# Patient Record
Sex: MALE | Race: WHITE | NOT HISPANIC OR LATINO | Employment: UNEMPLOYED | URBAN - METROPOLITAN AREA
[De-identification: names, ages, dates, MRNs, and addresses within clinical notes are randomized per-mention and may not be internally consistent; named-entity substitution may affect disease eponyms.]

---

## 2019-10-27 ASSESSMENT — MIFFLIN-ST. JEOR: SCORE: 1892.83

## 2019-10-28 ENCOUNTER — ANESTHESIA - HEALTHEAST (OUTPATIENT)
Dept: SURGERY | Facility: CLINIC | Age: 39
End: 2019-10-28

## 2019-10-28 ENCOUNTER — SURGERY - HEALTHEAST (OUTPATIENT)
Dept: SURGERY | Facility: CLINIC | Age: 39
End: 2019-10-28

## 2019-10-28 ASSESSMENT — MIFFLIN-ST. JEOR: SCORE: 1885.57

## 2019-10-29 ASSESSMENT — MIFFLIN-ST. JEOR: SCORE: 1888.75

## 2019-10-30 ASSESSMENT — MIFFLIN-ST. JEOR: SCORE: 1903

## 2019-10-31 ASSESSMENT — MIFFLIN-ST. JEOR: SCORE: 1899.18

## 2019-11-08 ENCOUNTER — OFFICE VISIT - HEALTHEAST (OUTPATIENT)
Dept: SURGERY | Facility: CLINIC | Age: 39
End: 2019-11-08

## 2019-11-08 DIAGNOSIS — K56.609 SBO (SMALL BOWEL OBSTRUCTION) (H): ICD-10-CM

## 2019-11-18 ENCOUNTER — AMBULATORY - HEALTHEAST (OUTPATIENT)
Dept: SURGERY | Facility: CLINIC | Age: 39
End: 2019-11-18

## 2021-06-02 NOTE — ANESTHESIA POSTPROCEDURE EVALUATION
Patient: Christian Mcnally  EXPLORATION LAPAROSCOPY, LAPAROTOMY, LYSIS OF ADHESIONS, REPAIR OF INTERNAL MESENTERIC HERNIA  Anesthesia type: general    Patient location: PACU  Last vitals:   Vitals Value Taken Time   /77 10/28/2019  2:00 PM   Temp 37  C (98.6  F) 10/28/2019  1:20 PM   Pulse 87 10/28/2019  2:03 PM   Resp 12 10/28/2019  2:03 PM   SpO2 96 % 10/28/2019  2:03 PM   Vitals shown include unvalidated device data.  Post vital signs: stable  Level of consciousness: awake and responds to simple questions  Post-anesthesia pain: pain controlled  Post-anesthesia nausea and vomiting: no  Pulmonary: unassisted, return to baseline  Cardiovascular: stable and blood pressure at baseline  Hydration: adequate  Anesthetic events: no    QCDR Measures:  ASA# 11 - Luci-op Cardiac Arrest: ASA11B - Patient did NOT experience unanticipated cardiac arrest  ASA# 12 - Luci-op Mortality Rate: ASA12B - Patient did NOT die  ASA# 13 - PACU Re-Intubation Rate: ASA13B - Patient did NOT require a new airway mgmt  ASA# 10 - Composite Anes Safety: ASA10A - No serious adverse event    Additional Notes:

## 2021-06-02 NOTE — ANESTHESIA CARE TRANSFER NOTE
Last vitals:   Vitals:    10/28/19 1252   BP: 140/80   Pulse: 89   Resp: 16   Temp: 36.9  C (98.4  F)   SpO2: 95%     Patient's level of consciousness is drowsy  Spontaneous respirations: yes  Maintains airway independently: yes  Dentition unchanged: yes  Oropharynx: oropharynx clear of all foreign objects    QCDR Measures:  ASA# 20 - Surgical Safety Checklist: WHO surgical safety checklist completed prior to induction    PQRS# 430 - Adult PONV Prevention: 4558F - Pt received => 2 anti-emetic agents (different classes) preop & intraop  ASA# 8 - Peds PONV Prevention: NA - Not pediatric patient, not GA or 2 or more risk factors NOT present  PQRS# 424 - Luci-op Temp Management: 4559F - At least one body temp DOCUMENTED => 35.5C or 95.9F within required timeframe  PQRS# 426 - PACU Transfer Protocol: - Transfer of care checklist used  ASA# 14 - Acute Post-op Pain: ASA14B - Patient did NOT experience pain >= 7 out of 10

## 2021-06-02 NOTE — ANESTHESIA PREPROCEDURE EVALUATION
Anesthesia Evaluation      Patient summary reviewed     Airway   Mallampati: I  Neck ROM: full   Pulmonary - negative ROS and normal exam    breath sounds clear to auscultation                         Cardiovascular - negative ROS and normal exam  Rhythm: regular  Rate: normal,         Neuro/Psych - negative ROS     Endo/Other - negative ROS      GI/Hepatic/Renal    (+)   chronic renal disease,           Dental - normal exam                        Anesthesia Plan  Planned anesthetic: general endotracheal    ASA 2   Induction: intravenous   Anesthetic plan and risks discussed with: spouse and patient  Anesthesia plan special considerations: rapid sequence induction,   Post-op plan: routine recovery

## 2021-06-03 VITALS
SYSTOLIC BLOOD PRESSURE: 120 MMHG | TEMPERATURE: 98.3 F | DIASTOLIC BLOOD PRESSURE: 62 MMHG | BODY MASS INDEX: 26.85 KG/M2 | HEART RATE: 80 BPM | WEIGHT: 198 LBS | OXYGEN SATURATION: 99 %

## 2021-06-03 VITALS — HEIGHT: 72 IN | WEIGHT: 210.8 LBS | BODY MASS INDEX: 28.55 KG/M2

## 2021-06-03 NOTE — PROGRESS NOTES
HPI: Pt is here for follow up of a explore lap and reduction and repair of an internal hernia.  He is doing well. His appetite is good, and bowel function regular.  No fevers or chills. Ambulating without problems.       /62 (Patient Site: Right Arm, Patient Position: Sitting, Cuff Size: Adult Small)   Pulse 80   Temp 98.3  F (36.8  C) (Tympanic)   Wt 198 lb (89.8 kg)   SpO2 99%   BMI 26.85 kg/m        EXAM: This is a  39 y.o. MAN in no distress  GENERAL: Appears well  CHEST clear  CVS S1S2 NSR  ABDOMEN: Soft, non-tender.   EXT: warm, moves without difficulty      Assessment/Plan:   S/P repair of internal hernia  2-3 more weeks of no strenuous activity or heavy lifting.  Diet as tolerates  Staples out today  Keep steri-strips on for 7-10 days  Follow up with me as needed  Call for questions or concerns    Stuart Sarmiento MD  [facility] Department of Surgery

## 2021-06-03 NOTE — PROGRESS NOTES
Attending Physicians Statement and [place] paperwork received on 11/18/19 by fax. When completed, forms to be faxed to:        [employer]  F: [phone]  Attn: Randi Rdz  P: [phone]      Denise Uribe LPN  [facility]  General Surgery  P: [phone]  F: [phone]

## 2021-06-16 PROBLEM — F15.93 CAFFEINE WITHDRAWAL (H): Status: ACTIVE | Noted: 2019-10-27

## 2021-06-16 PROBLEM — R11.14 BILIOUS VOMITING WITH NAUSEA: Status: ACTIVE | Noted: 2019-10-27

## 2021-06-16 PROBLEM — N17.9 AKI (ACUTE KIDNEY INJURY) (H): Status: ACTIVE | Noted: 2019-10-27

## 2021-06-16 PROBLEM — F33.40 RECURRENT MAJOR DEPRESSIVE DISORDER, IN REMISSION (H): Status: ACTIVE | Noted: 2019-10-27

## 2021-06-16 PROBLEM — K56.609 SBO (SMALL BOWEL OBSTRUCTION) (H): Status: ACTIVE | Noted: 2019-10-26

## 2021-06-16 PROBLEM — Z01.810 PRE-OPERATIVE CARDIOVASCULAR EXAMINATION: Status: ACTIVE | Noted: 2019-10-28

## 2021-06-16 PROBLEM — Z01.818 PRE-OPERATIVE GENERAL PHYSICAL EXAMINATION: Status: ACTIVE | Noted: 2019-10-28

## 2021-06-27 ENCOUNTER — HEALTH MAINTENANCE LETTER (OUTPATIENT)
Age: 41
End: 2021-06-27

## 2021-10-16 ENCOUNTER — HEALTH MAINTENANCE LETTER (OUTPATIENT)
Age: 41
End: 2021-10-16

## 2022-07-23 ENCOUNTER — HEALTH MAINTENANCE LETTER (OUTPATIENT)
Age: 42
End: 2022-07-23

## 2022-10-01 ENCOUNTER — HEALTH MAINTENANCE LETTER (OUTPATIENT)
Age: 42
End: 2022-10-01

## 2023-08-06 ENCOUNTER — HEALTH MAINTENANCE LETTER (OUTPATIENT)
Age: 43
End: 2023-08-06

## 2024-01-07 PROBLEM — F15.93 CAFFEINE WITHDRAWAL (H): Status: RESOLVED | Noted: 2019-10-27 | Resolved: 2024-01-07

## 2024-03-03 ENCOUNTER — HOSPITAL ENCOUNTER (EMERGENCY)
Facility: CLINIC | Age: 44
Discharge: HOME | End: 2024-03-03
Attending: EMERGENCY MEDICINE | Admitting: EMERGENCY MEDICINE
Payer: COMMERCIAL

## 2024-03-03 ENCOUNTER — APPOINTMENT (OUTPATIENT)
Dept: RADIOLOGY | Facility: CLINIC | Age: 44
End: 2024-03-03
Attending: EMERGENCY MEDICINE
Payer: COMMERCIAL

## 2024-03-03 VITALS
HEART RATE: 97 BPM | WEIGHT: 196 LBS | DIASTOLIC BLOOD PRESSURE: 94 MMHG | SYSTOLIC BLOOD PRESSURE: 142 MMHG | TEMPERATURE: 98.4 F | BODY MASS INDEX: 26.55 KG/M2 | HEIGHT: 72 IN | OXYGEN SATURATION: 99 % | RESPIRATION RATE: 18 BRPM

## 2024-03-03 DIAGNOSIS — R00.2 PALPITATIONS: ICD-10-CM

## 2024-03-03 DIAGNOSIS — R07.9 CHEST PAIN, UNSPECIFIED TYPE: ICD-10-CM

## 2024-03-03 LAB
ANION GAP SERPL CALCULATED.3IONS-SCNC: 11 MMOL/L (ref 7–15)
BASOPHILS # BLD AUTO: 0.1 10E3/UL (ref 0–0.2)
BASOPHILS NFR BLD AUTO: 1 %
BUN SERPL-MCNC: 17.6 MG/DL (ref 6–20)
CALCIUM SERPL-MCNC: 9.6 MG/DL (ref 8.6–10)
CHLORIDE SERPL-SCNC: 103 MMOL/L (ref 98–107)
CREAT SERPL-MCNC: 1.13 MG/DL (ref 0.67–1.17)
DEPRECATED HCO3 PLAS-SCNC: 25 MMOL/L (ref 22–29)
EGFRCR SERPLBLD CKD-EPI 2021: 82 ML/MIN/1.73M2
EOSINOPHIL # BLD AUTO: 0.3 10E3/UL (ref 0–0.7)
EOSINOPHIL NFR BLD AUTO: 4 %
ERYTHROCYTE [DISTWIDTH] IN BLOOD BY AUTOMATED COUNT: 12.8 % (ref 10–15)
GLUCOSE SERPL-MCNC: 129 MG/DL (ref 70–99)
HCT VFR BLD AUTO: 42.7 % (ref 40–53)
HGB BLD-MCNC: 14.8 G/DL (ref 13.3–17.7)
IMM GRANULOCYTES # BLD: 0 10E3/UL
IMM GRANULOCYTES NFR BLD: 0 %
LYMPHOCYTES # BLD AUTO: 2.1 10E3/UL (ref 0–5.3)
LYMPHOCYTES NFR BLD AUTO: 36 %
MCH RBC QN AUTO: 31 PG (ref 26.5–33)
MCHC RBC AUTO-ENTMCNC: 34.7 G/DL (ref 31.5–36.5)
MCV RBC AUTO: 89 FL (ref 78–100)
MONOCYTES # BLD AUTO: 0.5 10E3/UL (ref 0–1.3)
MONOCYTES NFR BLD AUTO: 8 %
NEUTROPHILS # BLD AUTO: 2.9 10E3/UL (ref 1.6–8.3)
NEUTROPHILS NFR BLD AUTO: 51 %
NRBC # BLD AUTO: 0 10E3/UL
NRBC BLD AUTO-RTO: 0 /100
PLATELET # BLD AUTO: 255 10E3/UL (ref 150–450)
POTASSIUM SERPL-SCNC: 3.5 MMOL/L (ref 3.4–5.3)
RBC # BLD AUTO: 4.78 10E6/UL (ref 4.4–5.9)
SODIUM SERPL-SCNC: 139 MMOL/L (ref 135–145)
T4 FREE SERPL-MCNC: 1.06 NG/DL (ref 0.9–1.7)
TROPONIN T SERPL HS-MCNC: <6 NG/L
TSH SERPL DL<=0.005 MIU/L-ACNC: 5.36 UIU/ML (ref 0.3–4.2)
WBC # BLD AUTO: 5.7 10E3/UL (ref 4–11)

## 2024-03-03 PROCEDURE — 71046 X-RAY EXAM CHEST 2 VIEWS: CPT

## 2024-03-03 PROCEDURE — 36415 COLL VENOUS BLD VENIPUNCTURE: CPT | Performed by: EMERGENCY MEDICINE

## 2024-03-03 PROCEDURE — 250N000013 HC RX MED GY IP 250 OP 250 PS 637: Performed by: EMERGENCY MEDICINE

## 2024-03-03 PROCEDURE — 85025 COMPLETE CBC W/AUTO DIFF WBC: CPT | Performed by: EMERGENCY MEDICINE

## 2024-03-03 PROCEDURE — 99285 EMERGENCY DEPT VISIT HI MDM: CPT | Mod: 25

## 2024-03-03 PROCEDURE — 84484 ASSAY OF TROPONIN QUANT: CPT | Performed by: EMERGENCY MEDICINE

## 2024-03-03 PROCEDURE — 84439 ASSAY OF FREE THYROXINE: CPT | Performed by: EMERGENCY MEDICINE

## 2024-03-03 PROCEDURE — 80048 BASIC METABOLIC PNL TOTAL CA: CPT | Performed by: EMERGENCY MEDICINE

## 2024-03-03 PROCEDURE — 84443 ASSAY THYROID STIM HORMONE: CPT | Performed by: EMERGENCY MEDICINE

## 2024-03-03 PROCEDURE — 93005 ELECTROCARDIOGRAM TRACING: CPT | Performed by: EMERGENCY MEDICINE

## 2024-03-03 RX ORDER — ASPIRIN 81 MG/1
324 TABLET, CHEWABLE ORAL ONCE
Status: COMPLETED | OUTPATIENT
Start: 2024-03-03 | End: 2024-03-03

## 2024-03-03 RX ADMIN — ASPIRIN 81 MG CHEWABLE TABLET 324 MG: 81 TABLET CHEWABLE at 21:45

## 2024-03-03 ASSESSMENT — COLUMBIA-SUICIDE SEVERITY RATING SCALE - C-SSRS
6. HAVE YOU EVER DONE ANYTHING, STARTED TO DO ANYTHING, OR PREPARED TO DO ANYTHING TO END YOUR LIFE?: NO
1. IN THE PAST MONTH, HAVE YOU WISHED YOU WERE DEAD OR WISHED YOU COULD GO TO SLEEP AND NOT WAKE UP?: NO
2. HAVE YOU ACTUALLY HAD ANY THOUGHTS OF KILLING YOURSELF IN THE PAST MONTH?: NO

## 2024-03-03 ASSESSMENT — ACTIVITIES OF DAILY LIVING (ADL)
ADLS_ACUITY_SCORE: 35
ADLS_ACUITY_SCORE: 35

## 2024-03-04 LAB
ATRIAL RATE - MUSE: 97 BPM
DIASTOLIC BLOOD PRESSURE - MUSE: NORMAL MMHG
INTERPRETATION ECG - MUSE: NORMAL
P AXIS - MUSE: 68 DEGREES
PR INTERVAL - MUSE: 176 MS
QRS DURATION - MUSE: 98 MS
QT - MUSE: 344 MS
QTC - MUSE: 436 MS
R AXIS - MUSE: 92 DEGREES
SYSTOLIC BLOOD PRESSURE - MUSE: NORMAL MMHG
T AXIS - MUSE: 42 DEGREES
VENTRICULAR RATE- MUSE: 97 BPM

## 2024-03-04 NOTE — ED TRIAGE NOTES
Pt presents to the ED with c/o palpitations that started last night - pt hx of anxiety and took Ativan without relief. Denies CP or SOB during triage.

## 2024-03-04 NOTE — ED PROVIDER NOTES
EMERGENCY DEPARTMENT ENCOUNTER      NAME: Christian Mcnally  AGE: 43 year old male  YOB: 1980  MRN: [id number]  EVALUATION DATE & TIME: 3/3/2024  9:22 PM    PCP: Stephen Holland    ED PROVIDER: Asia Centeno MD      Chief Complaint   Patient presents with    Palpitations         FINAL IMPRESSION:  1. Palpitations    2. Chest pain, unspecified type          ED COURSE & MEDICAL DECISION MAKING:    Pertinent Labs & Imaging studies reviewed. (See chart for details)  43 year old male presents to the Emergency Department for evaluation of point tenderness in the left side of the chest and palpitations.  He developed the left-sided chest discomfort at 3 in the morning last evening, and has been constant.  He also had an episode where his heart rate was in the 120s, he felt clammy in his hands with paresthesias.  Single troponin was obtained and was negative.  Initial TSH was slightly elevated, free T4 was normal, indicating no signs of any thyroid disease.  Electrolytes were within normal limits.  Chest x-ray is unremarkable.  ECG with no acute ischemic changes.  Patient has reproducible left-sided chest wall tenderness.  This is likely musculoskeletal.  Patient reports he does have an underlying history of anxiety, the area of chest pain may have understandably triggered some anxiety.  I would recommend use of acetaminophen and ibuprofen as needed for pain and close follow-up with his PMD for repeat evaluation.  He is comfortable with this plan.    11:05 PM Patient updated with work-up    At the conclusion of the encounter I discussed the results of all of the tests and the disposition. The questions were answered. The patient or family acknowledged understanding and was agreeable with the care plan.         Medical Decision Making  Obtained supplemental history:Supplemental history obtained?: No  Reviewed external records: External records reviewed?: No  Care impacted by chronic illness:N/A  Care  significantly affected by social determinants of health:Access to Medical Care  Did you consider but not order tests?: Work up considered but not performed and documented in chart, if applicable  Did you interpret images independently?: Independent interpretation of ECG and images noted in documentation, when applicable.  Consultation discussion with other provider:Did you involve another provider (consultant, , pharmacy, etc.)?: No  Discharge. No recommendations on prescription strength medication(s). See documentation for any additional details.      MEDICATIONS GIVEN IN THE EMERGENCY:  Medications   aspirin (ASA) chewable tablet 324 mg (324 mg Oral $Given 3/3/24 9983)       NEW PRESCRIPTIONS STARTED AT TODAY'S ER VISIT  New Prescriptions    No medications on file          =================================================================    HPI    Patient information was obtained from: Patient    Use of : N/A         Christian Mcnally is a 43 year old male with a pertinent history of anxiety who presents to this ED for evaluation of left sided chest pain and heart racing.  At 3 in the morning last evening, he awoke with point tenderness in the left side of his chest.  He reports that he was able to fall back asleep but he has continued to have a constant, mild discomfort in 1 spot in the left side of his chest.  It is reproducible with range of motion of his left upper extremity.  He denies feeling short of breath.  He reports that around 530 this evening, he had clamminess and tingling in his bilateral hands.  Around 8 PM, he noted that his heart rate was in the 120s.  He does have a mobile cardiac monitor that he purchased this morning and states that this reported that he was in sinus rhythm.  He denies any exertional chest pain.  He has been trying to take 0.25 mg of lorazepam x 2 to help with possible anxiety contributing to his symptoms.  He reports that his wife is currently out of town, he was  concerned if anything happened to him, how his 4-year-old daughter would get out of the home.  He denies a history of hypertension, hyperlipidemia, diabetes.  He denies tobacco use.      PAST MEDICAL HISTORY:  No past medical history on file.    PAST SURGICAL HISTORY:  Past Surgical History:   Procedure Laterality Date    EYE SURGERY      child    LAPAROTOMY EXPLORATORY N/A 10/28/2019    Procedure: LAPAROTOMY, LYSIS OF ADHESIONS, REPAIR OF INTERNAL MESENTERIC HERNIA;  Surgeon: Stuart Sarmiento MD;  Location: [facility];  Service: General    HI LAP,DIAGNOSTIC ABDOMEN N/A 10/28/2019    Procedure: EXPLORATION LAPAROSCOPY;  Surgeon: Stuart Sarmiento MD;  Location: [facility];  Service: General           CURRENT MEDICATIONS:    acetaminophen (TYLENOL) 500 MG tablet  buPROPion (WELLBUTRIN SR) 100 MG 12 hr tablet  LORazepam (ATIVAN) 0.5 MG tablet  senna-docusate (PERICOLACE) 8.6-50 mg tablet  venlafaxine (EFFEXOR-XR) 75 MG 24 hr capsule        ALLERGIES:  No Known Allergies    FAMILY HISTORY:  No family history on file.    SOCIAL HISTORY:   Social History     Socioeconomic History    Marital status:    Tobacco Use    Smoking status: Never    Smokeless tobacco: Never   Substance and Sexual Activity    Alcohol use: Yes     Alcohol/week: 5.0 - 10.0 standard drinks of alcohol    Drug use: No       VITALS:  BP (!) 142/94   Pulse 97   Temp 98.4  F (36.9  C) (Temporal)   Resp 18   Ht 1.829 m (6')   Wt 88.9 kg (196 lb)   SpO2 99%   BMI 26.58 kg/m      PHYSICAL EXAM    Constitutional: Well developed, Well nourished, NAD  HENT: Normocephalic, Atraumatic, Bilateral external ears normal, Oropharynx normal, mucous membranes moist, Nose normal.   Neck- Normal range of motion, No tenderness, Supple, No stridor.  Eyes: PERRL, EOMI, Conjunctiva normal, No discharge.   Respiratory: Normal breath sounds, No respiratory distress  Cardiovascular: Normal heart rate, Regular rhythm  GI: Bowel sounds normal, Soft,  No tenderness,   Musculoskeletal: No edema. Good range of motion in all major joints. Point tenderness in the left anterior chest wall  Integument: Warm, Dry, No erythema, No rash  Neurologic: Alert & oriented x 3, Normal motor function, Normal sensory function, No focal deficits noted. Normal gait.   Psychiatric: Affect normal, Judgment normal, Mood normal.      LAB:  All pertinent labs reviewed and interpreted.  Results for orders placed or performed during the hospital encounter of 03/03/24   XR Chest 2 Views    Impression    IMPRESSION: Negative chest.   Basic metabolic panel   Result Value Ref Range    Sodium 139 135 - 145 mmol/L    Potassium 3.5 3.4 - 5.3 mmol/L    Chloride 103 98 - 107 mmol/L    Carbon Dioxide (CO2) 25 22 - 29 mmol/L    Anion Gap 11 7 - 15 mmol/L    Urea Nitrogen 17.6 6.0 - 20.0 mg/dL    Creatinine 1.13 0.67 - 1.17 mg/dL    GFR Estimate 82 >60 mL/min/1.73m2    Calcium 9.6 8.6 - 10.0 mg/dL    Glucose 129 (H) 70 - 99 mg/dL   Result Value Ref Range    Troponin T, High Sensitivity <6 <=22 ng/L   TSH with free T4 reflex   Result Value Ref Range    TSH 5.36 (H) 0.30 - 4.20 uIU/mL   CBC with platelets and differential   Result Value Ref Range    WBC Count 5.7 4.0 - 11.0 10e3/uL    RBC Count 4.78 4.40 - 5.90 10e6/uL    Hemoglobin 14.8 13.3 - 17.7 g/dL    Hematocrit 42.7 40.0 - 53.0 %    MCV 89 78 - 100 fL    MCH 31.0 26.5 - 33.0 pg    MCHC 34.7 31.5 - 36.5 g/dL    RDW 12.8 10.0 - 15.0 %    Platelet Count 255 150 - 450 10e3/uL    % Neutrophils 51 %    % Lymphocytes 36 %    % Monocytes 8 %    % Eosinophils 4 %    % Basophils 1 %    % Immature Granulocytes 0 %    NRBCs per 100 WBC 0 <1 /100    Absolute Neutrophils 2.9 1.6 - 8.3 10e3/uL    Absolute Lymphocytes 2.1 0.0 - 5.3 10e3/uL    Absolute Monocytes 0.5 0.0 - 1.3 10e3/uL    Absolute Eosinophils 0.3 0.0 - 0.7 10e3/uL    Absolute Basophils 0.1 0.0 - 0.2 10e3/uL    Absolute Immature Granulocytes 0.0 <=0.4 10e3/uL    Absolute NRBCs 0.0 10e3/uL   Result  Value Ref Range    Free T4 1.06 0.90 - 1.70 ng/dL       RADIOLOGY:  Reviewed all pertinent imaging. Please see official radiology report.  XR Chest 2 Views   Final Result   IMPRESSION: Negative chest.          EKG:    Performed at: 21:21    Impression: sinus rhythm    Rate: 97 bpm  Rhythm: sinus    ME Interval: 176 ms  QRS Interval: 98 ms  QTc Interval: 436 ms  ST Changes: no acute ischemia  Comparison: sinus rhythm has replaced sinus bradycardia from 10/26/19    I have independently reviewed and interpreted the EKG(s) documented above.      Asia Centeno MD  Emergency Medicine  [facility] EMERGENCY ROOM  [address] [facility] [id number] [phone]       Asia Centeno MD  03/03/24 9890

## 2024-05-12 ENCOUNTER — HEALTH MAINTENANCE LETTER (OUTPATIENT)
Age: 44
End: 2024-05-12

## 2025-05-18 ENCOUNTER — HEALTH MAINTENANCE LETTER (OUTPATIENT)
Age: 45
End: 2025-05-18